# Patient Record
Sex: MALE | Race: WHITE | NOT HISPANIC OR LATINO | Employment: OTHER | URBAN - METROPOLITAN AREA
[De-identification: names, ages, dates, MRNs, and addresses within clinical notes are randomized per-mention and may not be internally consistent; named-entity substitution may affect disease eponyms.]

---

## 2019-01-23 ENCOUNTER — OFFICE VISIT (OUTPATIENT)
Dept: URGENT CARE | Facility: CLINIC | Age: 34
End: 2019-01-23
Payer: MEDICARE

## 2019-01-23 VITALS
OXYGEN SATURATION: 94 % | HEART RATE: 82 BPM | BODY MASS INDEX: 32.82 KG/M2 | HEIGHT: 65 IN | SYSTOLIC BLOOD PRESSURE: 118 MMHG | WEIGHT: 197 LBS | RESPIRATION RATE: 15 BRPM | DIASTOLIC BLOOD PRESSURE: 72 MMHG | TEMPERATURE: 97.5 F

## 2019-01-23 DIAGNOSIS — J06.9 VIRAL URI WITH COUGH: ICD-10-CM

## 2019-01-23 PROCEDURE — 99202 OFFICE O/P NEW SF 15 MIN: CPT | Performed by: NURSE PRACTITIONER

## 2019-01-23 RX ORDER — ZOLPIDEM TARTRATE 10 MG/1
TABLET ORAL
COMMUNITY

## 2019-01-23 RX ORDER — ESCITALOPRAM OXALATE 10 MG/1
TABLET ORAL
COMMUNITY

## 2019-01-23 RX ORDER — ESCITALOPRAM OXALATE 10 MG/1
10 TABLET ORAL
COMMUNITY

## 2019-01-23 RX ORDER — AMOXICILLIN 250 MG
CAPSULE ORAL
COMMUNITY

## 2019-01-23 RX ORDER — METOCLOPRAMIDE 10 MG/1
TABLET ORAL
COMMUNITY
Start: 2019-01-07

## 2019-01-23 RX ORDER — NORTRIPTYLINE HYDROCHLORIDE 25 MG/1
25 CAPSULE ORAL
COMMUNITY

## 2019-01-23 RX ORDER — CALCIUM CARBONATE 500 MG/1
1 TABLET, CHEWABLE ORAL
COMMUNITY

## 2019-01-23 RX ORDER — CYCLOBENZAPRINE HCL 10 MG
10 TABLET ORAL
COMMUNITY

## 2019-01-23 RX ORDER — PROMETHAZINE HYDROCHLORIDE 25 MG/1
25 TABLET ORAL
COMMUNITY

## 2019-01-23 RX ORDER — OMEPRAZOLE 40 MG/1
40 CAPSULE, DELAYED RELEASE ORAL
COMMUNITY

## 2019-01-23 RX ORDER — DOCUSATE SODIUM 100 MG/1
CAPSULE, LIQUID FILLED ORAL
COMMUNITY

## 2019-01-23 RX ORDER — CITALOPRAM 20 MG/1
20 TABLET ORAL DAILY
COMMUNITY

## 2019-01-23 RX ORDER — ESOMEPRAZOLE MAGNESIUM 10 MG/1
GRANULE, FOR SUSPENSION, EXTENDED RELEASE ORAL
COMMUNITY

## 2019-01-23 RX ORDER — CETIRIZINE HYDROCHLORIDE 10 MG/1
10 TABLET ORAL
COMMUNITY

## 2019-01-23 RX ORDER — HYDROCODONE BITARTRATE AND ACETAMINOPHEN 5; 325 MG/1; MG/1
1 TABLET ORAL
COMMUNITY

## 2019-01-23 RX ORDER — ONDANSETRON 8 MG/1
8 TABLET, ORALLY DISINTEGRATING ORAL
COMMUNITY

## 2019-01-23 RX ORDER — MORPHINE SULFATE 10 MG/5ML
SOLUTION ORAL
Refills: 0 | COMMUNITY
Start: 2018-10-27

## 2019-01-23 RX ORDER — PROMETHAZINE HYDROCHLORIDE 12.5 MG/1
TABLET ORAL
COMMUNITY

## 2019-01-23 RX ORDER — METOCLOPRAMIDE HYDROCHLORIDE 5 MG/5ML
10 SOLUTION ORAL
COMMUNITY

## 2019-01-23 RX ORDER — DICYCLOMINE HYDROCHLORIDE 10 MG/5ML
SOLUTION ORAL
COMMUNITY

## 2019-01-23 RX ORDER — HYDROCODONE BITARTRATE AND ACETAMINOPHEN 5; 325 MG/1; MG/1
1 TABLET ORAL
COMMUNITY
Start: 2015-06-30

## 2019-01-23 RX ORDER — MORPHINE SULFATE 10 MG/5ML
SOLUTION ORAL
COMMUNITY

## 2019-01-23 ASSESSMENT — ENCOUNTER SYMPTOMS
COUGH: 1
EYE DISCHARGE: 0
SORE THROAT: 1
ORTHOPNEA: 0
CHILLS: 0
NAUSEA: 0
FEVER: 0
HEADACHES: 1
MYALGIAS: 1
WHEEZING: 0
SPUTUM PRODUCTION: 1
DIARRHEA: 0
SHORTNESS OF BREATH: 0

## 2019-01-23 NOTE — PROGRESS NOTES
"Subjective:      Prashant Bocanegra is a 33 y.o. male who presents with Cold Symptoms            HPI New problem. 33 year old male with congestion, cough and runny nose for 5 days. His brother is historian today due to mental retardation. Denies fever, chills, nausea or diarrhea. He has productive cough but no shortness of breath or wheeze. Taking otc cough and cold medication.  Enoxaparin and Sulfa drugs  No current outpatient prescriptions on file prior to visit.     No current facility-administered medications on file prior to visit.      Social History     Social History   • Marital status: Single     Spouse name: N/A   • Number of children: N/A   • Years of education: N/A     Occupational History   • Not on file.     Social History Main Topics   • Smoking status: Never Smoker   • Smokeless tobacco: Never Used   • Alcohol use Not on file   • Drug use: Unknown   • Sexual activity: Not on file     Other Topics Concern   • Not on file     Social History Narrative   • No narrative on file     family history is not on file.      Review of Systems   Constitutional: Positive for malaise/fatigue. Negative for chills and fever.   HENT: Positive for congestion and sore throat.    Eyes: Negative for discharge.   Respiratory: Positive for cough and sputum production. Negative for shortness of breath and wheezing.    Cardiovascular: Negative for chest pain and orthopnea.   Gastrointestinal: Negative for diarrhea and nausea.   Musculoskeletal: Positive for myalgias.   Neurological: Positive for headaches.   Endo/Heme/Allergies: Negative for environmental allergies.          Objective:     /72 (BP Location: Right arm, Patient Position: Sitting, BP Cuff Size: Adult)   Pulse 82   Temp 36.4 °C (97.5 °F) (Temporal)   Resp 15   Ht 1.651 m (5' 5\")   Wt 89.4 kg (197 lb)   SpO2 94%   BMI 32.78 kg/m²      Physical Exam   Constitutional: He is oriented to person, place, and time. He appears well-developed and well-nourished. " No distress.   HENT:   Head: Normocephalic and atraumatic.   Right Ear: External ear and ear canal normal. Tympanic membrane is not injected and not perforated. No middle ear effusion.   Left Ear: External ear and ear canal normal. Tympanic membrane is not injected and not perforated.  No middle ear effusion.   Nose: Mucosal edema present.   Mouth/Throat: No oropharyngeal exudate or posterior oropharyngeal erythema.   Eyes: Conjunctivae are normal. Right eye exhibits no discharge. Left eye exhibits no discharge.   Neck: Normal range of motion. Neck supple.   Cardiovascular: Normal rate, regular rhythm and normal heart sounds.    No murmur heard.  Pulmonary/Chest: Effort normal and breath sounds normal. No respiratory distress.   Musculoskeletal: Normal range of motion.   Normal movement of all 4 extremities.   Lymphadenopathy:     He has no cervical adenopathy.        Right: No supraclavicular adenopathy present.        Left: No supraclavicular adenopathy present.   Neurological: He is alert and oriented to person, place, and time. Gait normal.   Skin: Skin is warm and dry.   Psychiatric: He has a normal mood and affect. His behavior is normal. Thought content normal.   Nursing note and vitals reviewed.              Assessment/Plan:     1. Viral URI with cough         Viral illness at this time with no indication for antibiotics. Reviewed with patient expected course of illness and also reviewed OTC medications that may be used for symptom relief. Follow up 7-10 days if not improving.